# Patient Record
Sex: MALE | Race: WHITE | NOT HISPANIC OR LATINO | Employment: FULL TIME | URBAN - METROPOLITAN AREA
[De-identification: names, ages, dates, MRNs, and addresses within clinical notes are randomized per-mention and may not be internally consistent; named-entity substitution may affect disease eponyms.]

---

## 2021-01-05 ENCOUNTER — OFFICE VISIT (OUTPATIENT)
Dept: FAMILY MEDICINE CLINIC | Facility: CLINIC | Age: 61
End: 2021-01-05

## 2021-01-05 VITALS
HEIGHT: 73 IN | OXYGEN SATURATION: 98 % | HEART RATE: 92 BPM | SYSTOLIC BLOOD PRESSURE: 120 MMHG | TEMPERATURE: 97.8 F | DIASTOLIC BLOOD PRESSURE: 60 MMHG | BODY MASS INDEX: 27.06 KG/M2 | WEIGHT: 204.2 LBS

## 2021-01-05 DIAGNOSIS — M25.561 CHRONIC PAIN OF BOTH KNEES: Primary | ICD-10-CM

## 2021-01-05 DIAGNOSIS — M25.662 KNEE JOINT STIFFNESS, BILATERAL: ICD-10-CM

## 2021-01-05 DIAGNOSIS — G89.29 CHRONIC PAIN OF BOTH KNEES: Primary | ICD-10-CM

## 2021-01-05 DIAGNOSIS — M25.469 KNEE SWELLING: ICD-10-CM

## 2021-01-05 DIAGNOSIS — M25.661 KNEE JOINT STIFFNESS, BILATERAL: ICD-10-CM

## 2021-01-05 DIAGNOSIS — M25.562 CHRONIC PAIN OF BOTH KNEES: Primary | ICD-10-CM

## 2021-01-05 PROCEDURE — 99203 OFFICE O/P NEW LOW 30 MIN: CPT | Performed by: FAMILY MEDICINE

## 2021-01-05 RX ORDER — PANTOPRAZOLE SODIUM 20 MG/1
TABLET, DELAYED RELEASE ORAL DAILY
COMMUNITY
End: 2021-05-24

## 2021-01-05 RX ORDER — DOCUSATE CALCIUM 240 MG
240 CAPSULE ORAL 2 TIMES DAILY
COMMUNITY
End: 2021-01-05

## 2021-01-05 RX ORDER — HYDROCODONE BITARTRATE AND ACETAMINOPHEN 5; 325 MG/1; MG/1
TABLET ORAL EVERY 6 HOURS PRN
COMMUNITY
End: 2021-08-06

## 2021-01-05 RX ORDER — DOCUSATE SODIUM 100 MG/1
100 CAPSULE, LIQUID FILLED ORAL 2 TIMES DAILY
COMMUNITY

## 2021-01-05 NOTE — PROGRESS NOTES
"Chief Complaint  Knee Pain and Shoulder Pain    Subjective          Alonso Treadwell presents to [facility] for   Patient is here today as a new patient to me.  He recently went to the [facility] emergency room on December 21, 2020 for abdominal pain and abdominal swelling.  He was diagnosed with a perforation of his stomach and also cholecystitis.  He underwent a laparotomy with gastric repair and cholecystectomy.  He was discharged on December 28, 2020.  He has a follow-up with trauma surgery tomorrow at [facility].  His last bowel movement was last night and it was normal.  He has not had any vomiting or nausea.  Although his appetite is decreased.  He is having some incisional pain as well but denies any bleeding or draining from the incision.     He is also here because he has chronic knee pain.  He was just able to get insurance about a month prior to his hospitalization.  He has suffered from bilateral knee pain for years.  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  He has swelling and pain in both knees intermittently.  He has never had a x-ray or been seen by orthopedic surgeon.  He was taking over-the-counter anti-inflammatories but now that he had a perforated ulcer in the stomach he can no longer take those.  He has run out of his hydrocodone given to him during his hospitalization and does not know what to take for pain at this point.      Objective   Vital Signs:   /60   Pulse 92   Temp 97.8 °F (36.6 °C)   Ht 185.4 cm (73\")   Wt 92.6 kg (204 lb 3.2 oz)   SpO2 98%   BMI 26.94 kg/m²     Physical Exam  Vitals signs and nursing note reviewed.   Constitutional:       Appearance: Normal appearance. He is well-developed and normal weight.   HENT:      Head: Normocephalic and atraumatic.   Eyes:      General: No scleral icterus.     Conjunctiva/sclera: Conjunctivae normal.   Neck:      Musculoskeletal: Normal range of motion and " neck supple.   Cardiovascular:      Rate and Rhythm: Normal rate and regular rhythm.      Heart sounds: Normal heart sounds.   Pulmonary:      Effort: Pulmonary effort is normal.      Breath sounds: Normal breath sounds.   Abdominal:      General: Bowel sounds are normal. There is no distension.      Palpations: Abdomen is soft. There is no mass.      Tenderness: There is abdominal tenderness (Mild incisional tenderness). There is no right CVA tenderness, left CVA tenderness, guarding or rebound.      Hernia: No hernia is present.      Comments: Midline incisional wound is intact.  No bleeding or purulent discharge is seen.  All staples are still intact.  Minimal old healing superficial ecchymosis is noted.   Musculoskeletal: Normal range of motion.         General: Swelling and tenderness present.      Right lower leg: No edema.      Left lower leg: No edema.      Comments: Left greater than right knee pain associated with chronic joint effusion and tenderness to palpation bilaterally.   Skin:     General: Skin is warm and dry.      Capillary Refill: Capillary refill takes less than 2 seconds.      Findings: No rash.   Neurological:      General: No focal deficit present.      Mental Status: He is alert and oriented to person, place, and time.      Sensory: No sensory deficit.      Motor: No weakness.      Coordination: Coordination normal.      Gait: Gait abnormal (Antalgic gait).      Deep Tendon Reflexes: Reflexes normal.   Psychiatric:         Mood and Affect: Mood normal.         Behavior: Behavior normal.         Thought Content: Thought content normal.         Judgment: Judgment normal.        Result Review :                 Assessment and Plan    Problem List Items Addressed This Visit     None      Visit Diagnoses     Chronic pain of both knees    -  Primary    Relevant Orders    XR Knee 3 View Left    XR Knee 3 View Right    Knee swelling        Relevant Orders    XR Knee 3 View Left    XR Knee 3 View  Right    Knee joint stiffness, bilateral        Relevant Orders    XR Knee 3 View Left    XR Knee 3 View Right        Records from [facility] were requested.  Patient was given Dr. Monteiro's name is a local surgeon here in [place] that he could continue following up with if he did not want to continue seeing the trauma surgeons at [facility].  However, he was encouraged to keep the appointment for tomorrow so that the staples can be removed and his abdominal wound can be evaluated further for healing.  X-rays of both knees will be obtained but I feel that the patient will require a referral to orthopedic surgery.  I advised the patient to use Tylenol 1000 mg every 8 hours for the pain.  He was also advised to use ice no heat.  I encouraged the patient to use splinting as well.        Follow Up {Instructions Charge Capture  Follow-up Communications :23}  No follow-ups on file.  Patient was given instructions and counseling regarding his condition or for health maintenance advice. Please see specific information pulled into the AVS if appropriate.

## 2021-01-21 ENCOUNTER — TELEPHONE (OUTPATIENT)
Dept: FAMILY MEDICINE CLINIC | Facility: CLINIC | Age: 61
End: 2021-01-21